# Patient Record
(demographics unavailable — no encounter records)

---

## 2024-12-16 NOTE — REVIEW OF SYSTEMS
[Diarrhea: Grade 0] : Diarrhea: Grade 0 [Negative] : Allergic/Immunologic [FreeTextEntry7] : ventral hernia+

## 2024-12-16 NOTE — CONSULT LETTER
[Dear  ___] : Dear  [unfilled], [Courtesy Letter:] : I had the pleasure of seeing your patient, [unfilled], in my office today. [Please see my note below.] : Please see my note below. [Consult Closing:] : Thank you very much for allowing me to participate in the care of this patient.  If you have any questions, please do not hesitate to contact me. [Sincerely,] : Sincerely, [FreeTextEntry3] : Katt Heath D.O. Director, Hepatobiliary Malignancies   of Medicine [facility] [employer] [facility] at [facility]/[place] [address] Ph: [phone] Fax: [phone]

## 2024-12-16 NOTE — CONSULT LETTER
[Dear  ___] : Dear  [unfilled], [Courtesy Letter:] : I had the pleasure of seeing your patient, [unfilled], in my office today. [Please see my note below.] : Please see my note below. [Consult Closing:] : Thank you very much for allowing me to participate in the care of this patient.  If you have any questions, please do not hesitate to contact me. [Sincerely,] : Sincerely, [FreeTextEntry3] : Katt Heath D.O. Director, Hepatobiliary Malignancies   of Medicine [facility] at [facility]/[place] [address] Ph: [phone] Fax: [phone]

## 2024-12-16 NOTE — PHYSICAL EXAM
[Fully active, able to carry on all pre-disease performance without restriction] : Status 0 - Fully active, able to carry on all pre-disease performance without restriction [Normal] : affect appropriate [de-identified] : + abdominal ventral hernia, reducible - wears a brace, no hepatosplenomegaly

## 2024-12-16 NOTE — ASSESSMENT
[Curative] : Goals of care discussed with patient: Curative [Palliative Care Plan] : not applicable at this time [FreeTextEntry1] : findings of fatty liver and gastritis noted. Pt is not having significant dyspepsia. DM is well controlled.  Will cont to monitor.

## 2024-12-16 NOTE — PHYSICAL EXAM
[Fully active, able to carry on all pre-disease performance without restriction] : Status 0 - Fully active, able to carry on all pre-disease performance without restriction [Normal] : affect appropriate [de-identified] : + abdominal ventral hernia, reducible - wears a brace, no hepatosplenomegaly

## 2024-12-16 NOTE — HISTORY OF PRESENT ILLNESS
[Disease: _____________________] : Disease: [unfilled] [T: ___] : T[unfilled] [N: ___] : N[unfilled] [M: ___] : M[unfilled] [AJCC Stage: ____] : AJCC Stage: [unfilled] [de-identified] : 86 yo M with a PMH DM2 and HTN who presents as an initial consultation for ampullary adenocarcinoma.  He had been feeling well until late December, when his PCP noticed abnormal LFTs. He had a CT and was found to have biliary ductal dilation with abrupt tapering at the distal CBD. He sent him to GI, who performed an EGD/ERCP on 1/27/22, which showed a protuberant major papilla with a mass-like appearance. He had a sphincterotomy, biliary stent, and bile brush bx (showing atypical cells) and bile duct biopsy (suspicious for adenocarcinoma). He then was sent to see surgery, who performed a Whipple on 3/10/22. Biopsy showed ampullary adenocarcinoma.  He has since recovered from his surgery, denying abdominal pain, N/V, diarrhea, constipation, or blood in his stool. He has a slight sour taste in his mouth and has lost 5 lb since the surgery. He says prior to his surgery, he was feeling well, denying any symptoms except for a 5 lb weight loss.  4/5/22: CT CAP: likely post op fluid surrounding SMV, no mets 4/25-7/5/22: C1-C6 Gem[facility] 7/9/22: CT CAP: no interval change compared to 4/5/22 scans 7/18-9/26/22: C7-C12 Gemzar. 9/27/23: CT CAP: No change from 4/5/23 scans. Follow b/l pulmonary nodules 12/16/23: CT CAP. Imaging unchanged, stable. 5/8/23: CT CAP. Stable exam. No evidence of recurrent or metastatic disease. 12/1/23: CT CAP: [facility]O 6/5/24: CT CAP:  [facility] 12/10/24: CT CAP: [facility]OCO [de-identified] : adenocarcinoma, pancreaticobiliary  [FreeTextEntry1] : surveillance [de-identified] : here for f/u. feels well. getting labs with PCP this week. recent scans neg for cancer occasional heart burn

## 2024-12-18 NOTE — CONSULT LETTER
[Dear  ___] : Dear  [unfilled], [Courtesy Letter:] : I had the pleasure of seeing your patient, [unfilled], in my office today. [( Thank you for referring [unfilled] for consultation for _____ )] : Thank you for referring [unfilled] for consultation for [unfilled] [Please see my note below.] : Please see my note below. [Consult Closing:] : Thank you very much for allowing me to participate in the care of this patient.  If you have any questions, please do not hesitate to contact me. [Sincerely,] : Sincerely, [FreeTextEntry3] : Josef Love MD, FICS, FACS Director of Surgical Oncology- [facility] , Department of Surgery [facility] at [address] [address]   (mob) [phone] (o) [phone] (f) [phone]  [DrDuncan  ___] : Dr. GONCALVES [DrDuncan ___] : Dr. GONCALVES

## 2024-12-18 NOTE — HISTORY OF PRESENT ILLNESS
[de-identified] : Mr. KATTY BROWN is a 87 year old male who present today for a follow up visit. PMH: HLD, DM, HTN  Family History: Brother leukemia at 75   ERCP 1/27/22:  1. Esophagus nodule biopsy: Gastric cardiac type mucosa with chronic inactive inflammation. No evidence of intestinal metaplasia. Additional levels are examined.  2. Bile duct, distal biopsy: Suspicious for adenocarcinoma. Additional levels are examined  3. Ampulla biopsy: Duodenal mucosa with a small focus of atypical glands, suspicious for adenocarcinoma. Additional levels are examined.   **SURGERY** He is s/p Whipple on 3/10/22. 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 Tumor extends more than 0.5 cm into pancreas. Margins of resection are negative for dysplasia or carcinoma. Lymphovascular invasion present. 2/28 lymph nodes involved by metastatic adenocarcinoma.   3/22/22 Patient was found to have elevated LFT suggestive of obstructive jaundice and underwent a CT abd/pelvis-revealing  moderate intra and extrahepatic biliary ductal dilation with abrupt tapering of the distal common bile duct at the ampulla and mild and abnormal enhancement in the this region raising concern for possible small underlying tumor. ERCP recommended.   CT C/A/P 9/27/22- tiny 2-3 mm bilateral pulmonary nodules are unchanged from 4/5/22.  No evidence of recurrent disease.   10/4/22- Today he is feeling well. He is tolerating diet without nausea/vomiting, moving his bowels without difficulty and denies pain or constitutional symptoms.  Continues chemotherapy with Dr. Heath (s/p C12 9/26/22).  CT C/A/P 12/2022- [facility]O  CT C/A/P 5/2023- [facility]  12/1/2023 : 9 (WNL)  12/7/2023 CT C/A/P (NW): [facility]  12/13/2023: Today patient is doing well. Umbilical hernia does not bother him, wears abdominal brace. Denies dysuria, flank pain, abdominal pain, nausea, vomiting, fevers/chills.   6/5/24 CT CAP: Stable exam. No evidence for metastatic disease.  6/26/24: Patient reports doing well today. Denies fever, chills, N/V/D, unintentional weight loss.  12/10/24 CT CAP (NW)- 1. Status post Whipple procedure without evidence of local recurrence or distant metastatic disease within the chest, abdomen, or pelvis. 2. Diffuse mild gastric wall thickening, suggestive of gastritis. 3. Hepatic steatosis.   PCP: Roberto Carlos Capellan 
Other Specify

## 2024-12-18 NOTE — ASSESSMENT
[FreeTextEntry1] : IMP: 87-year-old male presents with obstructive jaundice with distal Common Bile distal duct stricture s/p Whipple on 3/10/22. 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 Tumor extends more than 0.5 cm into pancreas. Margins of resection are negative for dysplasia or carcinoma. LVI present. 2/28 lymph nodes involved by metastatic adenocarcinoma. pT3bN1.  6/5/24 CT CAP: Stable exam. No evidence for metastatic disease.  12/10/24 CT CAP (NW)- 1. Status post Whipple procedure without evidence of local recurrence or distant metastatic disease within the chest, abdomen, or pelvis. 2. Diffuse mild gastric wall thickening, suggestive of gastritis. 3. Hepatic steatosis.  PLAN: - Continue care with med/onc Dr. Heath; defer imaging and lab work to oncology - RTO in 6 months after imaging June 2025.  I have discussed the diagnosis, therapeutic plan and options with the patient at length. Patient expressed verbal understanding to proceed with proposed plan. All questions answered.

## 2024-12-18 NOTE — PHYSICAL EXAM
[FreeTextEntry1] :   COVID -19 precautions as per [facility] policy was universally followed.

## 2024-12-31 NOTE — CONSULT LETTER
[FreeTextEntry3] : Josef Love MD, FICS, FACS Director of Surgical Oncology- [facility] , Department of Surgery [facility] at [address] [address]   (mob) [phone] (o) [phone] (f) [phone]

## 2024-12-31 NOTE — HISTORY OF PRESENT ILLNESS
[de-identified] : Mr. KATTY BROWN is a 87 year old male who present today for a follow up visit. PMH: HLD, DM, HTN  Family History: Brother leukemia at 75   ERCP 1/27/22:  1. Esophagus nodule biopsy: Gastric cardiac type mucosa with chronic inactive inflammation. No evidence of intestinal metaplasia. Additional levels are examined.  2. Bile duct, distal biopsy: Suspicious for adenocarcinoma. Additional levels are examined  3. Ampulla biopsy: Duodenal mucosa with a small focus of atypical glands, suspicious for adenocarcinoma. Additional levels are examined.   **SURGERY** He is s/p Whipple on 3/10/22. 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 Tumor extends more than 0.5 cm into pancreas. Margins of resection are negative for dysplasia or carcinoma. Lymphovascular invasion present. 2/28 lymph nodes involved by metastatic adenocarcinoma.   3/22/22 Patient was found to have elevated LFT suggestive of obstructive jaundice and underwent a CT abd/pelvis-revealing  moderate intra and extrahepatic biliary ductal dilation with abrupt tapering of the distal common bile duct at the ampulla and mild and abnormal enhancement in the this region raising concern for possible small underlying tumor. ERCP recommended.   CT C/A/P 9/27/22- tiny 2-3 mm bilateral pulmonary nodules are unchanged from 4/5/22.  No evidence of recurrent disease.   10/4/22- Today he is feeling well. He is tolerating diet without nausea/vomiting, moving his bowels without difficulty and denies pain or constitutional symptoms.  Continues chemotherapy with Dr. Heath (s/p C12 9/26/22).  CT C/A/P 12/2022- [facility]  CT C/A/P 5/2023- [facility]  12/1/2023 : 9 (WNL)  12/7/2023 CT C/A/P (NW): [facility]O  12/13/2023: Today patient is doing well. Umbilical hernia does not bother him, wears abdominal brace. Denies dysuria, flank pain, abdominal pain, nausea, vomiting, fevers/chills.   6/5/24 CT CAP: Stable exam. No evidence for metastatic disease.  6/26/24: Patient reports doing well today. Denies fever, chills, N/V/D, unintentional weight loss.  12/10/24 CT CAP (NW)- 1. Status post Whipple procedure without evidence of local recurrence or distant metastatic disease within the chest, abdomen, or pelvis. 2. Diffuse mild gastric wall thickening, suggestive of gastritis. 3. Hepatic steatosis.   PCP: Roberto Carlos Capellan